# Patient Record
(demographics unavailable — no encounter records)

---

## 2017-01-19 NOTE — MR
MRI of the Brain (Without and With Contrast) at 1346 hours



Clinical Indication: Altered mental status. Left frontal lobe subcortical hemorrhage or calcification
.



COMPARISON: CT brain today



Technique:  T1-weighted images were acquired axially and sagittally from the foramen magnum to the ve
rtex.  Axial fast inversion recovery, fast T2-weighted, and diffusion-weighted axial images were obta
ined without contrast. Postcontrast axial and coronal images with the uneventful intravenous administ
ration of 6.5 mL Gadavist contrast.



Findings:  The ventricles, cisterns, and sulci are normal without atrophy, hydrocephalus, midline braulio
ft, herniation, or epidural/subdural hematomas. In the left frontal lobe subcortical white matter pos
terior superior convexity there is a 6 x 4 mm lesion demonstrating hemosiderin blooming artifact, inc
reased T1 signal and subtle enhancement and possibly minimal hemorrhage consistent with a cavernous m
alformation/vascular malformation. No associated mass effect. Diffusion-weighted images demonstrate n
o acute infarct. Cerebellar tonsils are in normal position. Pituitary gland is normal in size. Normal
 signal flow-void in the superior sagittal sinus, basilar artery, and bilateral internal carotid zaina
joslyn indicating patency mucous retention cysts bilateral maxillary and left ethmoid sinuses.



Impression:  

1. Left frontal lobe 6 mm cavernous malformation without mass effect.

2. No acute infarct, hydrocephalus or herniation.

3. Bilateral sinusitis.



Findings and recommendations discussed with Emergency Department physician, Dr. Ayesha Stock at 14
45 hour, today.



Cosigned: Dr. Man Ambrose



Final report concurs with initial preliminary interpretation.

## 2017-01-19 NOTE — CPEKG
Heart Rate: 111

RR Interval: 541

P-R Interval: 152

QRSD Interval: 96

QT Interval: 348

QTC Interval: 473

P Axis: 70

QRS Axis: 91

T Wave Axis: 13

EKG Severity - ABNORMAL ECG -

EKG Impression: SINUS TACHYCARDIA

EKG Impression: BORDERLINE RIGHT AXIS DEVIATION

EKG Impression: INFERIOR Q WAVES, PROBABLY NORMAL VARIATION

EKG Impression: BORDERLINE PROLONGED QT INTERVAL

Electronically Signed By: Ayesha Stock 19-Jan-2017 15:19:13
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
## 2017-01-19 NOTE — DX
Portable AP chest. January 19, 2017at 12:08 PM



History: Evaluate for retained metallic object prior to MRI.



Findings: Lungs are clear. Heart size is normal. No retained metallic objects identified.



Impression: Patient is clear for MRI examination. No retained metallic objects identified.

## 2017-01-20 NOTE — BCON
[f 
rep st]



                                                  BEHAVIORAL HEALTH CONSULTATION





INTERNAL MEDICINE CONSULTATION



DATE OF CONSULTATION:  01/20/2017



REFERRING PHYSICIAN:  Alverto Lau MD



REASON FOR CONSULTATION:  Medical clearance for inpatient behavioral health 
stay.



HISTORY OF PRESENT ILLNESS:  Mr. Franklin was brought to the emergency 
department by EMS with altered mental status.  His mother, who was out of state
, had called for a welfare check as she had not heard from him for several 
days.  He was found to be in an altered mental state.  He was mostly nonverbal 
and did not answer questions in the emergency department.  Emergency department 
evaluation included a brain MRI, which showed a cavernous malformation in the 
left frontal lobe white matter, which radiology did not think was having any 
affect on his level of consciousness.  Laboratory studies showed a normal CBC, 
normal renal function, and electrolytes.  Toxicology screen was non-negative 
for benzodiazepines, but he received benzodiazepines in the emergency 
department for the purpose of a brain MRI, and was otherwise negative for 
substances of abuse.  He was evaluated by the mental health team and admitted 
for further psychiatric care. 



Currently, he is without any acute medical complaints.



PAST MEDICAL HISTORY:  Psychiatric disorder with episodes of psychosis.  He 
denies any other medical issues and has had no surgeries.



MEDICATIONS:  Prior to admission, he was not on any medications.



ALLERGIES:  There are no known drug allergies.



SOCIAL HISTORY:  He recently moved to [place].  He has considerable support 
from his family.  He lives alone in an apartment.  He has a history of alcohol 
use.



FAMILY HISTORY:  Noncontributory.



REVIEW OF SYSTEMS:  Mostly unobtainable.  He reports that he feels blessed.  He 
denies pain, dyspnea, or cough.  He denies headache or head trauma.



PHYSICAL EXAM:  VITAL SIGNS:  Have been quite variable.  His blood pressure 
this afternoon was 144/90.  Blood pressure at noon was recorded as 192/83.  
However, yesterday evening it was 129/100.  His pulse rate is 85.  His 
respiratory rate is 12.  His oxygen saturation is 96% on room air.  His 
temperature is 36.9 degrees centigrade.  His weight is 65.8 kg, for a body mass 
index of 22.  GENERAL:  This is a well-nourished, well-developed man with long 
hair, appears his chronologic age, cooperative, and in no acute distress.  HEENT
:  Extraocular movements are intact.  Pupils are equal, round, and reactive to 
light.  Mucous membranes are moist.  Dentition is in good condition.  NECK:  
Supple with no thyromegaly.  HEART:  Regular rate and rhythm with no murmurs, 
rubs, or gallops.  LUNGS:  Clear to auscultation bilaterally.  ABDOMEN:  Soft, 
nontender, and nondistended with normoactive bowel sounds.  EXTREMITIES:  There 
is no cyanosis, clubbing, or edema.  NEUROLOGIC:  He is alert and oriented to 
the month, the date, and the year.  He is not oriented to his location other 
than [facility].  He is slow to respond to questions and appears 
distracted.  Cranial nerves 2-12 are grossly intact.  There is no focal 
weakness.  Sensation is intact to light touch.  Deep tendon reflexes are 1+ 
bilaterally at the biceps and patellar tendons.  His gait is within normal 
limits.



LABORATORY STUDIES:  Summarized in the HPI.



ASSESSMENT/RECOMMENDATIONS:  

1.  Psychiatric issues.  Pending further evaluation and management per 
Psychiatry and the mental health team.

2.  Altered mental status.  Given normal laboratory testing and incidental 
finding on MRI scan, his altered mental status is most likely psychiatric in 
etiology; and unless he fails to respond to psychiatric measures, I would 
advise no further evaluation. 

3.  Elevated blood pressure.  Continue to monitor.  Consider initiating 
treatment if it remains consistently elevated.



I see no medical contraindications to the patient's continued stay in the 
inpatient behavioral health unit or to any psychiatric medications or 
procedures. 



Thank you very much for involving me in the care of this patient, and please do 
not hesitate to contact me or the hospitalist service should there be a need 
for further medical evaluation.





Job #:  [id number]

MTDD
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
## 2017-01-22 NOTE — SOAPPROG
SOAP Progress Note


Assessment/Plan: 


Assessment:


21yo found with AMS after welfare check requested by mother. Medical w/u neg. 

Admitted to [facility]/dabbing, 


was nonverbal and w/internal preoccupation upon admit, slowly improving





Plan:


M1 -> STC today.


continue Risperdal 2mg qhs. has been taking since started 1/20. 


ST eval may be helpful once pt more stable to assess cognition


c/o sinus congestion. MRI noted bilat sinusitis. 








01/21/17 15:28


slept 8hr 





on interview, pt reports here b/c "I'm just trying to get better (how?)

...trying to work, make more money".


stated he needed "to be more mindful, I've been selfish, lazy" recently. 

Recalls that police came and "intimidated me...they thought I was on something.

" When asked why police came to his apt, pt responded "I think I left my dog out

"
..."I smoked a little marijuana ("hybrid" type) and "I might have took a dab

"...which gave "instant relief of anxiety".


And took holistic supplements. 


Feels being in hosp





Denied any SI "my life is so beautiful", no HI "never". Depr "sometimes, but 

life is too short for negative feelings. no euphoria or irritability "I try to 

stay humble. Occasional anxiety, "meditation" helps and "klonopin"


admits to occasional ideas of reference of others talking/laughing at him (here 

some on unit too?. denied TI/TW or AH/VH. and "I felt really connected (to the 

TV programming) when they took him to the ER. 


denied having confused thoughts "I'm feeling pretty focused". 


"I'm trying to stay clean this year...I don't want any toxins in my blood"





feels it's "nice" here, "this place is blessed".


no phys c/o except sinus congestion. feels "bad vibes"





cooperative, tall, thin male, casually dressed, fair/goodeye contact, 

articulate speech, but low vol and nml/decr rate 


notably with delayed responses. affect restricted but at times laughs to self, 

mood blessed


tp/tc-denied AH/VH, +IOR, thought-blocking, and possibly with internal stimuli. 

occ laugh/chuckles inappropriately, and was noted briefly mumbling something to 

self during interview.


A&Ox3








met with mother x 20min:


very supportive of pt. 


discussed mental illness, and IOP


mom repts he is SO different from baseline, even from when he was presenting 

during psych/subst treatment last year


feels he persents "like a stroke victim", "mentally absent" although better 

today. 


felt previously he was a normal kid, "pothead" sometimes, but presently appears 

far from that baseline.


pt does take holistic meds she buys for him  (will try to bring down to show 

staff)


noted probs when he stopped calling daily. "I'm a psychic clairvoyant", and 

felt there was a problem.











Objective: 





 Vital Signs











Temp Pulse Resp BP Pulse Ox


 


 36.4 C   118 H  14   122/85 H  96 


 


 01/22/17 07:03  01/22/17 07:03  01/22/17 07:03  01/22/17 07:03  01/22/17 07:03














- Time Spent With Patient


Time Spent With Patient: 


25 min





- Pending Discharge


Pending Discharge Within 24 Hours: No


Pending Discharge Within 48 Hours: No





ICD10 Worksheet


Patient Problems: 


 Problems











Problem Status Diagnosed


 


Altered mental status, unspecified Acute 


 


Psychotic disorder Acute

## 2017-01-23 NOTE — SOAPPROG
SOAP Progress Note


Assessment/Plan: 


Assessment:Pt is a 22 y/o S male with no formal psych hx who has a hx of rehab 

for substance abuse who was brought to the ED by his mother for acute mental 

status change. Pt was nonverbal and was put on an M1 and has been taking 

Risperdal and is now talking but disorganized. [facility] and using 

holistic medicine. 


























Plan:will put pt on a STC


con't Risperdal








01/23/17 11:26





Subjective: 


"I'm trying to be more mindful."


Objective: 





 Vital Signs











Temp Pulse Resp BP Pulse Ox


 


 36.4 C   92   14   124/73 H  93 


 


 01/23/17 00:27  01/23/17 00:27  01/23/17 00:27  01/23/17 00:27  01/23/17 00:27








Pt is A+O 


mood-"I feel blessed"


ymkpmc-hbqkry-mxyobg at times


?AH denies A/V H


thoughts-vague, illogical 


speech-monotone


+IOR-feels others are laughing at time


mumbles to self


sleep /appetite-wnl


memory/conc-impaired


+thought blocking. 


I/J-limited











- Time Spent With Patient


Time Spent With Patient: 


20'





- Pending Discharge


Pending Discharge Within 24 Hours: No


Pending Discharge Within 48 Hours: No





ICD10 Worksheet


Patient Problems: 


 Problems











Problem Status Diagnosed


 


Altered mental status, unspecified Acute 


 


Psychotic disorder Acute

## 2017-01-24 NOTE — SOAPPROG
SOAP Progress Note


Assessment/Plan: 


Assessment:Pt is a 22 y/o S male with no formal psych hx who has a hx of rehab 

for substance abuse (drug of choice is MJ) who was brought to the ED by his 

mother for acute mental status change. Pt was nonverbal and was put on an M1 

and has been taking Risperdal and is now talking but disorganized. Admits to 

dabbing [facility] and using holistic medicine. He is now clearing and reports that 

although he was trying to stay clean and sober he was using MJ including 

dabbing most days prior to admission.  


























Plan:will put pt on a STC


con't Risperdal-mental status clearing


discussed MJ and the effects on MH with pt and told him he needs to remain 

clean and sober upon D/C














01/24/17 10:52





Subjective: 


"A lot better than when I came in."


Objective: 





 Vital Signs











Temp Pulse Resp BP Pulse Ox


 


 36.3 C   87   12   130/83 H  98 


 


 01/24/17 09:30  01/24/17 09:30  01/24/17 09:30  01/24/17 09:30  01/24/17 09:30








Pt is A+O x4


mood-"pretty good-really motivated"


affect-constricted


denies A/V H


no S/H I


thoughts-logical


speech-monotone


denies Par I


sleep/appetite/energy level-good


memory-improved


no ALONSO


NO delusions


conc-improving


I/J-improving





- Time Spent With Patient


Time Spent With Patient: 


25'





- Pending Discharge


Pending Discharge Within 24 Hours: No


Pending Discharge Within 48 Hours: No





ICD10 Worksheet


Patient Problems: 


 Problems











Problem Status Diagnosed


 


Altered mental status, unspecified Acute 


 


Psychotic disorder Acute

## 2017-01-25 NOTE — SOAPPROG
SOAP Progress Note


Assessment/Plan: 


Assessment:Pt is a 22 y/o S male with no formal psych hx who has a hx of rehab 

for substance abuse (drug of choice is MJ) who was brought to the ED by his 

mother for acute mental status change. Pt was nonverbal and was put on an M1 

and has been taking Risperdal and is now talking but disorganized. Admits to 

dabbing [facility] and using holistic medicine. He is now clearing and reports that 

although he was trying to stay clean and sober he was using MJ including 

dabbing most days prior to admission.  


























Plan: pt on a STC


con't Risperdal-mental status clearing


discussed MJ and the effects on MH with pt and told him he needs to remain 

clean and sober upon D/C


D/C tomorrow




















01/25/17 11:12





Subjective: 


"Much better."


Objective: 





 Vital Signs











Temp Pulse Resp BP Pulse Ox


 


 36.8 C   96   16   128/71 H  97 


 


 01/25/17 06:33  01/25/17 06:33  01/25/17 06:33  01/25/17 06:33  01/25/17 06:33








Pt is A+O x4 


mood-"better"


affect-constricted


thoughts-logical


speech-monotone


sleep/appetite/energy level-wnl


no sx psychosis/isra


denies A/V H


no S/H I


memory/conc-improving


no ALONSO


no Delusions


I/J-improving





- Time Spent With Patient


Time Spent With Patient: 


20'





- Pending Discharge


Pending Discharge Within 24 Hours: Yes


Pending Discharge Within 48 Hours: Yes


Pending Discharge Date: 01/26/17


Pending Discharge Time: 11:00





ICD10 Worksheet


Patient Problems: 


 Problems











Problem Status Diagnosed


 


Altered mental status, unspecified Acute 


 


Psychotic disorder Acute

## 2017-01-27 NOTE — BDS
[f 
rep st]



                                                  BEHAVIORAL HEALTH DISCHARGE 
SUMMARY





REASON FOR ADMISSION:  The patient reported he was "trying to get better."



HISTORY OF PRESENT ILLNESS:  The patient is a 21-year-old single  male 
who was brought into the [facility] ED by EMS with altered mental 
status after the patient's mother called for a welfare check.  Police found the 
patient with altered mental status and numerous holistic pills lying around the 
room.  The patient admitted to using marijuana.  Otherwise he was reportedly 
not responding verbally and not answering questions in the ED.  The patient had 
thought blocking and delayed responses to questions, often smiling and giggling 
inappropriately.  He denied any auditory or visual hallucinations, or suicidal 
or homicidal ideation.  The [facility] evaluator obtained collateral information from 
the patient's mother at the time of evaluation.  The patient's mother reported 
no previous psych diagnosis, but that he does have a history of polysubstance 
use disorder and has been in rehab in the past.  The patient does not have a 
history of psychotic symptoms when he is not using alcohol or drugs.  The 
patient's mother did report psychotic behaviors in the context of substance 
abuse in the past year.  He was in a dual diagnosis facility in [place], which 
was court ordered 6 months ago.



HOSPITAL COURSE:  The patient was started on Risperdal 2 mg at bedtime and 
Ativan p.r.n., as well as Zyprexa p.r.n.  His mental status slowly cleared.  He 
was more verbal, was eating and sleeping well, attending groups.  Thoughts were 
more logical and coherent and had no delusions.  The patient was put on a short-
term cert, because at that time, he was not competent to sign in voluntarily.  
The mother went to the patient's apartment, and there were found multiple 
different cannabis products all over the apartment--much, more than he had 
reported using.  The patient admitted to dabbing prior to admission and stated 
he had a hard time staying clean and sober after he left rehab.  The patient 
was using marijuana via dabbing and using large amounts of marijuana purchased 
at a dispensary.  The patient's mental status cleared, and he was ready for 
discharge.



MENTAL STATUS ON DISCHARGE:  The patient was alert and oriented x4.  Mood was 
euthymic.  Affect was appropriate.  The patient denied feeling suicidal or 
homicidal.  Denied auditory or visual hallucinations.  Thoughts logical and 
coherent.  No paranoid ideation.  Speech was monotone.  Memory was improved.  
Concentration was improved.  IQ and fund of knowledge were within normal 
limits.  No symptoms of psychosis or isra.  Insight and judgment were 
improved.  The patient was told he needed to abstain from all drugs, especially 
cannabis products, and he agreed to this.



DISCHARGE MEDICATIONS:  Risperdal 2 mg at bedtime, Ativan 0.5 mg to 1 mg q.6 
hours p.r.n. anxiety.



ADMISSION DIAGNOSES:  

1.  Psychosis not otherwise specified, rule out substance-induced psychosis, 
rule out schizophrenia spectrum and other psychotic disorder.  

2.  Cannabis use disorder, severe.  

3.  Rule out depressive disorder, unspecified.



DISCHARGE DIAGNOSES:  

1.  Cannabis-induced psychosis.  

2.  Cannabis use disorder, severe.



DISCHARGE PLAN:  The patient was discharged voluntarily.  His short-term cert 
was dropped.  He was given followup appointments with a psychiatrist and a 
therapist.  He was told not to use any cannabis products or any nonprescription 
drugs or alcohol.  His mother is in town to help him.  He is psychiatrically 
and medically stable for discharge and agreed to abstain from all substances 
upon discharge.





Job #:  [id number]

MTDD

## 2018-01-05 NOTE — CPEEG
[f 
rep st]



                                                      ELECTROENCEPHALOGRAM





DATE OF STUDY:  



DATE OF INTERPRETATION:  01/05/2018.



DATE OF STUDY:  01/03/2018.



INTERPRETATION:  This EEG is essentially normal.  There were no potentially 
epileptogenic abnormalities present during the awake or sleep recordings.  
There was some excess beta activity present in the background activity which 
can be a medication effect.



REPORT:  This EEG contains 10 Hz alpha to the posterior head regions.  There 
was no abnormal activation at rest, or during photic stimulation or 
hyperventilation.  The background activity contained excess beta activity over 
the frontocentral head regions.  The patient became drowsy and fell asleep 
during the study.  There was no abnormal activation during drowsiness, sleep or 
during times of arousal.





Job #:  [id number]

MTDD

## 2018-05-04 NOTE — EDPHY
General


Time Seen by Provider: 05/04/18 12:18


Narrative: 





CHIEF COMPLAINT: 


Nausea vomiting, abdominal pain





HISTORY OF PRESENT ILLNESS: 


One day of nausea, vomiting and abdominal cramping.  Started abruptly this 

morning.  No fever.  No chills.  No trauma or injury.  No bloody emesis or 

stools.  No recent travel or ingestion of stagnant water.  No known sick 

contacts.  He has not changed any medications.  He has iym-yhudmapp-nd-count 

episodes of vomiting today.  He does have some cramping with dry heaving.  He 

is starting to feel a little bit better after his last episode of emesis.  

Denies any marijuana use.  No other associated complaints or modifying factors.





REVIEW OF SYSTEMS:


Ten systems reviewed and are negative unless otherwise noted in the HPI





PCP:


[facility]





SPECIALISTS:


[facility]





PAST MEDICAL HISTORY: 


Anxiety, PTSD





PAST SURGICAL HISTORY:


No recent surgeries





SOCIAL HISTORY:


Nonsmoker.  No marijuana use.





FAMILY HISTORY:


Noncontributory





EXAMINATION


General Appearance:  Alert, no distress


Head: normocephalic, atraumatic


Eyes:  Pupils equal and round, no conjunctival pallor or injection


ENT, Mouth:  Mucous membranes slightly dry.  Airway widely patent.


Neck:  Normal inspection, supple, non-tender


Respiratory:  Lungs are clear to auscultation


Cardiovascular:  Regular rate and rhythm


Gastrointestinal:  Abdomen is soft and nontender.  No tympany rigidity.  No 

palpable masses.  Benign abdominal examination


Back: non-tender, no bony abnormalities


Neurological:  A&O, nonfocal, normal gait


Skin:  Warm and dry, no rash


Extremities:  Nontender, no pedal edema


Psychiatric:  Mood and affect normal





DIFFERENTIAL DIAGNOSES:


Including but not limited to gastritis, pancreatitis, cholecystitis, 

cholelithiasis, cyclical vomiting, food-borne illness








MDM:


12:35 p.m.


Acute nausea vomiting with no active vomiting at this time.  No diarrhea.  

Abdominal exam is benign.  Vital signs are within normal limits.  He is 

starting to feel better following the medication he received in route by EMS.  

He is in no acute distress.  I have ordered laboratory studies, IV fluid, 

further nausea medications and Bentyl.  Do not feel he warrants any emergent 

imaging at this time





1:12 p.m.


CBC reveals mild leukocytosis.  Chemistries unremarkable.  Lipase and liver 

function tests are within normal limits.





2:10 p.m.


Patient re-evaluated.  He is feeling significantly better.  He is tolerating 

water.  No vomiting.  No abdominal pain of any kind.  I discussed his normal 

laboratory studies.  I discussed discharge home with nausea medicines, clear 

liquid diet advancing slowly as tolerated.  We discussed ED precautions and 

follow up with primary care physician.  He is comfortable this plan and 

discharged home stable condition.





SUPERVISION:


Patient was independently examined, but I discussed the case with my secondary 

supervising physician Dr. Leong








- History


Smoking Status: Never smoked





- Objective


Vital Signs: 


 Initial Vital Signs











Temperature (C)  97.9 F   05/04/18 12:14


 


Heart Rate  73   05/04/18 12:14


 


Respiratory Rate  16   05/04/18 12:14


 


Blood Pressure  116/74   05/04/18 12:14


 


O2 Sat (%)  94   05/04/18 12:14








 











O2 Delivery Mode               Room Air














Allergies/Adverse Reactions: 


 





No Known Allergies Allergy (Unverified 01/19/17 10:19)


 








Home Medications: 














 Medication  Instructions  Recorded


 


Clonazepam  05/04/18


 


Ondansetron Odt [Zofran Odt 4 mg 4 mg PO Q6 PRN #12 tab 05/04/18





(*)]  


 


Promethazine HCl [Phenergan 25mg 25 mg PO Q8 PRN #12 tab 05/04/18





(*)]  











Laboratory Results: 


 Laboratory Results





 05/04/18 12:00 





 05/04/18 12:00 








Medications Given: 


 








Discontinued Medications





Dicyclomine HCl (Bentyl)  20 mg PO EDNOW ONE


   Stop: 05/04/18 12:36


   Last Admin: 05/04/18 12:51 Dose:  20 mg


Sodium Chloride (Ns)  1,000 mls @ 0 mls/hr IV EDNOW ONE; Wide Open


   PRN Reason: Protocol


   Stop: 05/04/18 12:35


   Last Admin: 05/04/18 12:51 Dose:  1,000 mls


Famotidine/Sodium Chloride (Pepcid 20 Mg (Premix))  50 mls @ 200 mls/hr IV 

EDNOW ONE


   Stop: 05/04/18 12:49


   Last Admin: 05/04/18 12:51 Dose:  50 mls


Promethazine HCl (Phenergan)  12.5 mg IVP ONCE ONE


   Stop: 05/04/18 12:35


   Last Admin: 05/04/18 12:51 Dose:  12.5 mg








Departure





- Departure


Disposition: Home, Routine, Self-Care


Clinical Impression: 


Nausea & vomiting


Qualifiers:


 Vomiting type: unspecified Vomiting Intractability: non-intractable Qualified 

Code(s): R11.2 - Nausea with vomiting, unspecified





Gastritis


Qualifiers:


 Gastritis type: unspecified gastritis Chronicity: acute Gastritis bleeding: 

without bleeding Qualified Code(s): K29.00 - Acute gastritis without bleeding





Condition: Good


Instructions:  Gastritis (ED), Acute Nausea and Vomiting (ED)


Additional Instructions: 


1. Medications as prescribed as needed for nausea


2. Pepcid over-the-counter, 20 mg twice daily for 7-10 days


3. Follow up with primary care physician


4. ED precautions as discussed


Referrals: 


Patient,NotPresent [Unknown] - As per Instructions


Prescriptions: 


Ondansetron Odt [Zofran Odt 4 mg (*)] 4 mg PO Q6 PRN #12 tab


 PRN Reason: Nausea/Vomiting, Use 1st


Promethazine HCl [Phenergan 25mg (*)] 25 mg PO Q8 PRN #12 tab


 PRN Reason: Nausea/Vomiting, Use 1st